# Patient Record
Sex: MALE | Race: BLACK OR AFRICAN AMERICAN | Employment: UNEMPLOYED | URBAN - METROPOLITAN AREA
[De-identification: names, ages, dates, MRNs, and addresses within clinical notes are randomized per-mention and may not be internally consistent; named-entity substitution may affect disease eponyms.]

---

## 2020-09-13 ENCOUNTER — HOSPITAL ENCOUNTER (EMERGENCY)
Age: 55
Discharge: HOME | End: 2020-09-13
Attending: EMERGENCY MEDICINE

## 2020-09-13 VITALS
OXYGEN SATURATION: 100 % | HEART RATE: 70 BPM | SYSTOLIC BLOOD PRESSURE: 162 MMHG | RESPIRATION RATE: 14 BRPM | DIASTOLIC BLOOD PRESSURE: 91 MMHG | TEMPERATURE: 97.8 F

## 2020-09-13 DIAGNOSIS — K04.7 DENTAL INFECTION: Primary | ICD-10-CM

## 2020-09-13 PROCEDURE — 99282 EMERGENCY DEPT VISIT SF MDM: CPT

## 2020-09-13 RX ORDER — IBUPROFEN 800 MG/1
800 TABLET ORAL
Qty: 30 TAB | Refills: 0 | Status: SHIPPED | OUTPATIENT
Start: 2020-09-13 | End: 2020-09-23

## 2020-09-13 RX ORDER — HYDROCODONE BITARTRATE AND ACETAMINOPHEN 5; 325 MG/1; MG/1
1 TABLET ORAL
Qty: 5 TAB | Refills: 0 | Status: SHIPPED | OUTPATIENT
Start: 2020-09-13 | End: 2020-09-16

## 2020-09-13 RX ORDER — GUAIFENESIN 100 MG/5ML
81 LIQUID (ML) ORAL DAILY
COMMUNITY

## 2020-09-13 RX ORDER — AMLODIPINE BESYLATE 10 MG/1
10 TABLET ORAL DAILY
COMMUNITY
Start: 2020-01-27

## 2020-09-13 RX ORDER — AMOXICILLIN AND CLAVULANATE POTASSIUM 875; 125 MG/1; MG/1
1 TABLET, FILM COATED ORAL 2 TIMES DAILY
Qty: 20 TAB | Refills: 0 | Status: SHIPPED | OUTPATIENT
Start: 2020-09-13

## 2020-09-13 NOTE — DISCHARGE INSTRUCTIONS
Patient Education        Abscessed Tooth: Care Instructions  Your Care Instructions     An abscessed tooth is a tooth that has a pocket of pus in the tissues around it. Pus forms when the body tries to fight an infection caused by bacteria. If the pus cannot drain, it forms an abscess. An abscessed tooth can cause red, swollen gums and throbbing pain, especially when you chew. You may have a bad taste in your mouth and a fever, and your jaw may swell. Damage to the tooth, untreated tooth decay, or gum disease can cause an abscessed tooth. An abscessed tooth needs to be treated by a dental professional right away. If it is not treated, the infection could spread to other parts of your body. Your dentist will give you antibiotics to stop the infection. He or she may make a hole in the tooth or cut open (freida) the abscess inside your mouth so that the infection can drain, which should relieve your pain. You may need to have a root canal treatment, which tries to save your tooth by taking out the infected pulp and replacing it with a healing medicine and/or a filling. If these treatments do not work, your tooth may have to be removed. Follow-up care is a key part of your treatment and safety. Be sure to make and go to all appointments, and call your doctor if you are having problems. It's also a good idea to know your test results and keep a list of the medicines you take. How can you care for yourself at home? · Reduce pain and swelling in your face and jaw by putting ice or a cold pack on the outside of your cheek. Do this for 10 to 20 minutes at a time. Put a thin cloth between the ice and your skin. · Take pain medicines exactly as directed. ? If the doctor gave you a prescription medicine for pain, take it as prescribed. ? If you are not taking a prescription pain medicine, ask your doctor if you can take an over-the-counter medicine. · Take antibiotics as directed.  Do not stop taking them just because you feel better. You need to take the full course of antibiotics. To prevent tooth abscess  · Brush and floss every day. Have regular dental checkups. · Eat a healthy diet. Avoid sugary foods and drinks. · Do not smoke or vape with nicotine. And don't use spit tobacco. Tobacco and nicotine slow your ability to heal. They increase your risk for gum disease and cancer of the mouth and throat. If you need help quitting, talk to your doctor about stop-smoking programs and medicines. These can increase your chances of quitting for good. When should you call for help? Call 911 anytime you think you may need emergency care. For example, call if:    · You have trouble breathing. Call your doctor now or seek immediate medical care if:    · You have new or worse symptoms of infection, such as:  ? Increased pain, swelling, warmth, or redness. ? Red streaks leading from the area. ? Pus draining from the area. ? A fever. Watch closely for changes in your health, and be sure to contact your doctor if:    · You do not get better as expected. Where can you learn more? Go to http://zaki-cinthia.info/  Enter L466 in the search box to learn more about \"Abscessed Tooth: Care Instructions. \"  Current as of: March 25, 2020               Content Version: 12.6  © 6644-6147 [employer]. Care instructions adapted under license by [employer] (which disclaims liability or warranty for this information). If you have questions about a medical condition or this instruction, always ask your healthcare professional. [place] [address] any warranty or liability for your use of this information. DENTAL CLINICS FOR FOLLOW UP:    Dr. Dakota Rob, LAURAS   [address] [place] [address]  [address] [place] [address] Street:  [address] [place] [address], [address] [place] [address]  [phone]  [facility] November, and [address] [facility]   [address]  [facility], [address]  [phone]  Joanie Fiddler, and Extractions    [place]  [address] [address]  [phone]  Fillings, Cleaning, and [address]  [address] [place], [address]  [phone]    JULIEN NEWMAN [facility] Department  [address], [address]  [phone]  Ages 3-18, if attending [facility]  [address] [facility], [address]  [phone]  Extractions, Alexandre Pandey, [facility]    [facility] [employer] [place]  [address], [address] [facility] [address]  [phone]  Oral Surgery - $70 required  Sunny Sidhu, Extractions - $100 Required    [address]   [address], [address]  [phone]  [facility] Only    [address], [address] [facility]  Dental Clinic Open September to June

## 2020-09-13 NOTE — ED TRIAGE NOTES
Pt arrives amublatory to ED with c\o dental pain to tooth #7, pt with redness and inlammation to gums

## 2022-10-18 NOTE — ED PROVIDER NOTES
EMERGENCY DEPARTMENT HISTORY AND PHYSICAL EXAM    Date: 9/13/2020  Patient Name: Anay Chew    History of Presenting Illness     Chief Complaint   Patient presents with    Dental Pain       History Provided By: Patient and Patient's Wife     History Norman Skiff):   8:46 AM  César Forbes is a 50 y.o. male with PMHX of HTN who presents to the emergency department C/O dental pain onset 2 days ago. Associated sxs include swelling, decay and looseness of the tooth. Pt denies fever, chills, nausea, vomiting or any other sxs or complaints. Patient has had pain in right upper incisor pain for 2 days. He states that he needs to see a dentist however he does not have insurance currently. Chief Complaint: Dental pain  Duration: 2 days  Timing: Acute   Location: right upper incisor  Quality: Sharp  Severity: 10 out of 10  Modifying Factors: Nothing makes it better, movement of the tooth makes it worse. Associated Symptoms: Swelling, decay, looseness of the tooth    PCP: Trinidad Tirado MD     Current Outpatient Medications   Medication Sig Dispense Refill    amLODIPine (NORVASC) 10 mg tablet Take 10 mg by mouth daily.  aspirin 81 mg chewable tablet Take 81 mg by mouth daily. Past History     Past Medical History:  Past Medical History:   Diagnosis Date    Hypertension        Past Surgical History:  History reviewed. No pertinent surgical history. Family History:  History reviewed. No pertinent family history. Social History:  Social History     Tobacco Use    Smoking status: Current Every Day Smoker    Smokeless tobacco: Never Used   Substance Use Topics    Alcohol use: Not Currently    Drug use: Not on file       Allergies:  No Known Allergies      Review of Systems     Review of Systems   Constitutional: Negative for chills and fever. HENT: Positive for dental problem. Negative for rhinorrhea and sore throat. Eyes: Negative for pain and visual disturbance.    Respiratory: Pt calling to check on rx.  He is going out of town and needs to  asap Requested Prescriptions     Pending Prescriptions Disp Refills    HYDROcodone-acetaminophen (NORCO) 5-325 mg per tablet 120 Tablet 0     Sig: Take 1 Tablet by mouth every four (4) hours as needed (chronic pain) for up to 30 days. Max Daily Amount: 6 Tablets. Patient states he has enough to last him a couple more days but will be going out of town next week and needs this refilled before then. [facility]co [facility]16[facility] for a 20 d/s & Ativan as 3/24/22 for a 16 d/s. Last Visit: 7/5/22 with MD Jennifer Bledsoe  Next Appointment: 1/17/23 with MD Jennifer Bledsoe  Previous Refill Encounter(s): 9/16/22 Norco #120, 3/24/22 Ativan #50    Requested Prescriptions     Pending Prescriptions Disp Refills    HYDROcodone-acetaminophen (NORCO) 5-325 mg per tablet 120 Tablet 0     Sig: Take 1 Tablet by mouth every four (4) hours as needed (chronic pain) for up to 30 days. Max Daily Amount: 6 Tablets. LORazepam (Ativan) 1 mg tablet 50 Tablet 0     Sig: Take 1 Tablet by mouth every eight (8) hours as needed for Anxiety. For [address] in place:   Recommendation Provided To:    Intervention Detail: New Rx: 2, reason: Patient Preference  Gap Closed?:   Intervention Accepted By:   Time Spent (min): 5 Negative for chest tightness, shortness of breath and wheezing. Cardiovascular: Negative for chest pain and palpitations. Gastrointestinal: Negative for abdominal pain, diarrhea, nausea and vomiting. Musculoskeletal: Negative for arthralgias and myalgias. Skin: Negative for rash and wound. Neurological: Negative for speech difficulty, light-headedness and headaches. Psychiatric/Behavioral: Negative for agitation and confusion. All other systems reviewed and are negative. Physical Exam     Vitals:    09/13/20 0830   Pulse: 70   Resp: 14   Temp: 97.8 °F (36.6 °C)   SpO2: 100%       Physical Exam  Vitals signs and nursing note reviewed. Constitutional:       General: He is not in acute distress. Appearance: Normal appearance. He is normal weight. He is not ill-appearing. HENT:      Head: Normocephalic and atraumatic. Nose: Nose normal. No rhinorrhea. Mouth/Throat:      Lips: No lesions. Mouth: Mucous membranes are moist.      Dentition: Abnormal dentition. Dental tenderness, gingival swelling and dental caries present. No dental abscesses. Tongue: No lesions. Tongue does not deviate from midline. Pharynx: No oropharyngeal exudate or posterior oropharyngeal erythema. Comments: Several missing teeth in the upper and lower rows  Eyes:      Extraocular Movements: Extraocular movements intact. Conjunctiva/sclera: Conjunctivae normal.      Pupils: Pupils are equal, round, and reactive to light. Neck:      Musculoskeletal: Normal range of motion and neck supple. No neck rigidity. Cardiovascular:      Rate and Rhythm: Normal rate and regular rhythm. Heart sounds: No murmur. No friction rub. No gallop. Pulmonary:      Effort: Pulmonary effort is normal. No respiratory distress. Breath sounds: Normal breath sounds. No wheezing, rhonchi or rales. Abdominal:      General: Bowel sounds are normal.      Palpations: Abdomen is soft.       Tenderness: There is no abdominal tenderness. There is no guarding or rebound. Musculoskeletal: Normal range of motion. General: No swelling, tenderness or deformity. Lymphadenopathy:      Cervical: No cervical adenopathy. Skin:     General: Skin is warm and dry. Findings: No rash. Neurological:      General: No focal deficit present. Mental Status: He is alert and oriented to person, place, and time. Psychiatric:         Mood and Affect: Mood normal.         Behavior: Behavior normal.         Diagnostic Study Results     Labs -   No results found for this or any previous visit (from the past 12 hour(s)). Radiologic Studies -   No orders to display     CT Results  (Last 48 hours)    None        CXR Results  (Last 48 hours)    None          Medications given in the ED-  Medications - No data to display      Medical Decision Making   I am the first provider for this patient. I reviewed the vital signs, available nursing notes, past medical history, past surgical history, family history and social history. Vital Signs-Reviewed the patient's vital signs. Pulse Oximetry Analysis - 100% on room air    Cardiac Monitor:  Rate: 70 bpm  Rhythm: NSR    Records Reviewed: Nursing Notes    Provider Notes (Medical Decision Making):   DDX: Patience Arceo, dental caries, dental pain, dental abscess    Procedures:  Procedures    ED Course:   8:46 AM Initial assessment performed. The patients presenting problems have been discussed, and they are in agreement with the care plan formulated and outlined with them. I have encouraged them to ask questions as they arise throughout their visit. Diagnosis and Disposition     Discussion:  50 y.o. male with dental caries and likely infected upper incisor. Patient has very poor dentition and is in need of seeing a dentist.  Patient states he does not have insurance. Will cover him with antibiotics and a small amount of pain medication.   Also provided patient a list of low-cost affordable dentists in the area. Patient states he will try and follow-up with 1 of them. Patient understands agrees this plan. DISCHARGE NOTE:  9:02 PM   César Villar  results have been reviewed with him. He has been counseled regarding his diagnosis, treatment, and plan. He verbally conveys understanding and agreement of the signs, symptoms, diagnosis, treatment and prognosis and additionally agrees to follow up as discussed. He also agrees with the care-plan and conveys that all of his questions have been answered. I have also provided discharge instructions for him that include: educational information regarding their diagnosis and treatment, and list of reasons why they would want to return to the ED prior to their follow-up appointment, should his condition change. He has been provided with education for proper emergency department utilization. CLINICAL IMPRESSION:    No diagnosis found. PLAN:  1. D/C Home  2. Current Discharge Medication List        3. Follow-up Information    None           Dragon Disclaimer     Please note that this dictation was completed with Ubi, the computer voice recognition software. Quite often unanticipated grammatical, syntax, homophones, and other interpretive errors are inadvertently transcribed by the computer software. Please disregard these errors. Please excuse any errors that have escaped final proofreading.     Louisa Grover MD No